# Patient Record
(demographics unavailable — no encounter records)

---

## 2024-12-14 NOTE — ADDENDUM
[FreeTextEntry1] :  By signing my name below, I, Uma Marcus, attest that this document has been prepared under the direction and in the presence of Dr. Howard.  I, Elsa Howard MD, personally performed the services described in this documentation. All medical record entries made by the scribe were at my discretion and in my presence. I have reviewed the chart and discharge instructions (if applicable) and agree that the record reflects my personal permanence and is accurate and complete.

## 2024-12-14 NOTE — REVIEW OF SYSTEMS
[Negative] : Heme/Lymph [Blurred Vision] : blurred vision [Decreased Libido] : decreased libido [Headaches] : headaches [Fatigue] : fatigue

## 2024-12-14 NOTE — ASSESSMENT
[FreeTextEntry1] : This is a 36-year-old male here for an evaluation for a pituitary microadenoma and secondary hypogonadism.  Prolactin level normal.  Check morning fasting testosterone levels, LH, FSH, free T4, IGF-1, prolactin, cortisol.  Advised to discontinue current supplements and repeat bloodwork in 2 months.  Repeat MRI in pituitary 2025.  Will refer back to urologist if he desires TRT.

## 2024-12-14 NOTE — HISTORY OF PRESENT ILLNESS
[FreeTextEntry1] : CC: pituitary adenoma  This is a 36-year-old male with no significant past medical history here for an evaluation for a pituitary microadenoma and secondary hypogonadism.  Reports that he has been feeling sluggish and poor libido for approximately 1 year.  Bloodwork (non-fasting, afternoon) from urologist Dr. Calixto Abdullahi showed testosterone level 20.6, free testosterone 0.44, SHBG normal 21.6. LH and FSH low. TSH and prolactin normal.  He is not currently taking TRT.  MRI pituitary showed a possible 7 mm pituitary microadenoma.  Reports occasional blurry vision and headaches.  He is taking glutamine, branch chain amino acids, creatine, whey protein, vitamin A, D, C, E, B-complex, biotin, and fish oil.